# Patient Record
(demographics unavailable — no encounter records)

---

## 2024-10-09 NOTE — ASSESSMENT
[FreeTextEntry1] :  ID#[id number] 31yo s/p D&C for TOP on 9/24, c/f retained POC, now s/p buccal misoprostol. Pt states that since taking miso, has not had any pain or cramping, and bleeding has resolved. Did take diflucan x1 for yeast infection. Vaginitis panel from last vvisit also showed BV.  Pt stated concerns today of recurring abnormal vaginal discharge, sometimes w foul odor. States has been douching with plain water and sometimes vaginal soap to keep the area clean. We discussed discontinuing this practice, as this disrupts the healthy normal vaginal khoa that maintains the pH balance in the vagina. We discussed how having BV is not a contraindication for IUD insertion, but if she starts having fevers, pain or foul smelling discharge afterwards, to notify me. Pt advised not to have sex until she has completed her treatment for BV.  Also discussed that I don't think she had retained POC, bedside US reveals empty uterus w thin uterine stripe and no areas +flow. Additionally, clinically the bleeding has resolved, and her beta hCG has downtrended significantly. However, I would like to see her back, and in the unlikely event that her beta hCG plateaus, we may have to remove the IUD and do a D&C. The risk of this is low, and the benefits of providing contraception in this patient outweighs this risk.  Plan: #BV - PO metronidazole x 7d - discussed  hygeine and not douching - no sex until completed treatment #contraception - Mirena IUD placed, US confirms correct placement, will f/u in 1 wk for repeat bHCG

## 2024-10-09 NOTE — PROCEDURE
[IUD Placement] : intrauterine device (IUD) placement [Time out performed] : Pre-procedure time out performed.  Patient's name, date of birth and procedure confirmed. [Consent Obtained] : Consent obtained [Prevention of Pregnancy] : prevention of pregnancy [Risks] : risks [Benefits] : benefits [Alternatives] : alternatives [Patient] : patient [Infection] : infection [Bleeding] : bleeding [Pain] : pain [Expulsion] : expulsion [Failure] : failure [Uterine Perforation] : uterine perforation [No Premedication] : No premedication [Tenaculum] : Tenaculum [Easy Passage] : Easy passage [Sounded to ___ cm] : sounded to [unfilled] ~Ucm [Mirena IUD] : Mirena IUD [Tolerated Well] : Patient tolerated the procedure well [No Complications] : No complications [None] : None [History of Unprotected [place]] : no history of unprotected intercourse [de-identified] : s/p D&C for TOP 2 weeks ago [de-identified] : postplacement abd US showed correct placement [de-identified] : [id number] [de-identified] : 7/2026 [de-identified] : 2032

## 2024-10-16 NOTE — PLAN
[FreeTextEntry1] : 30yo s/p D&C for TOP, c/b retained POC s/p cytotec. IUD placed last week, here for follow-up, Urine hCG is negative today. IUD strings in situ, approx 2-3cm - RTC in 3 months for IUD check or earlier PRN

## 2024-10-16 NOTE — REASON FOR VISIT
[Follow-Up] : a follow-up evaluation of [[employer] ] : provided by [employer]   [Interpreters_IDNumber] : [id number] [TWNoteComboBox1] : [place]ian

## 2024-10-16 NOTE — PLAN
[FreeTextEntry1] : 32yo s/p D&C for TOP, c/b retained POC s/p cytotec. IUD placed last week, here for follow-up, Urine hCG is negative today. IUD strings in situ, approx 2-3cm - RTC in 3 months for IUD check or earlier PRN

## 2024-10-16 NOTE — PHYSICAL EXAM
[Chaperone Present] : A chaperone was present in the examining room during all aspects of the physical examination [[id number]] : A chaperone was present during the pelvic exam. [FreeTextEntry2] : CECE Ballard [Appropriately responsive] : appropriately responsive [Alert] : alert [No Acute Distress] : no acute distress [Labia Majora] : normal [Labia Minora] : normal [Normal] : normal [IUD String] : an IUD string was noted

## 2024-10-16 NOTE — REASON FOR VISIT
[Follow-Up] : a follow-up evaluation of [[employer] ] : provided by [employer]   [Interpreters_IDNumber] : [id number] [TWNoteComboBox1] : [place]ian

## 2024-10-16 NOTE — PHYSICAL EXAM
[Chaperone Present] : A chaperone was present in the examining room during all aspects of the physical examination [[id number]] : A chaperone was present during the pelvic exam. [FreeTextEntry2] : CECE Ballard [Appropriately responsive] : appropriately responsive [Alert] : alert [No Acute Distress] : no acute distress [Labia Majora] : normal [Labia Minora] : normal [Normal] : normal [IUD String] : an IUD string was noted

## 2024-10-16 NOTE — HISTORY OF PRESENT ILLNESS
[FreeTextEntry1] : 30yo here for gyn f/u. urine hCG negative. Pt states having some bleeding, denies pelvic pain. Overall happy w IUD.

## 2024-10-16 NOTE — HISTORY OF PRESENT ILLNESS
[FreeTextEntry1] : 32yo here for gyn f/u. urine hCG negative. Pt states having some bleeding, denies pelvic pain. Overall happy w IUD.

## 2025-01-17 NOTE — PHYSICAL EXAM
[Chaperone Present] : A chaperone was present in the examining room during all aspects of the physical examination [[id number]] : A chaperone was present during the pelvic exam. [FreeTextEntry2] : KERI Lobo [Appropriately responsive] : appropriately responsive [Alert] : alert [No Acute Distress] : no acute distress [Soft] : soft [Non-tender] : non-tender [Non-distended] : non-distended [Oriented x3] : oriented x3 [Labia Majora] : normal [Labia Minora] : normal [Discharge] : discharge [Scant] : scant [Henrry] : yellow [Thin] : thin [IUD String] : an IUD string was noted [Normal] : normal [Uterine Adnexae] : normal [FreeTextEntry5] : IUD string approx 3cm from os noted

## 2025-01-17 NOTE — REASON FOR VISIT
[Follow-Up] : a follow-up evaluation of [[employer] ] : provided by [employer]   [Interpreters_IDNumber] : [id number] [TWNoteComboBox1] : Croatian

## 2025-01-17 NOTE — HISTORY OF PRESENT ILLNESS
[Y] : Patient is sexually active [FreeTextEntry1] : Patient is here for IUD check  Endorses persistent vaginal spotting. Having vaginal itchiness w discharge w odor. Otherwise no complaints w IUD.  [PapSmeardate] : 9/16/24 [LMPDate] : 1/13/25 [PGHxTotal] : 2 [PGHxFullTerm] : 0 [PGHxPremature] : 0 [PGHxAbortions] : 2 [BannerxLiving] : 0 [PGHxABInduced] : 1 [PGHxABSpont] : 0 [PGxEctopic] : 1 [PGHxMultBirths] : 0

## 2025-01-17 NOTE — PLAN
[FreeTextEntry1] : 32yo here for IUD check. Strings appear in appropriate place, however unable to visualize uterus on US today. Discussed spotting is a side effect of Mirena for some people, but it may resolve w time. Vaginal symptoms c/w BV. - ref for official sono given, will call after results return - will rx flagyl, f/u vaginitis swab